# Patient Record
Sex: MALE | Employment: OTHER | URBAN - METROPOLITAN AREA
[De-identification: names, ages, dates, MRNs, and addresses within clinical notes are randomized per-mention and may not be internally consistent; named-entity substitution may affect disease eponyms.]

---

## 2020-01-08 ENCOUNTER — TRANSCRIBE ORDERS (OUTPATIENT)
Dept: ADMINISTRATIVE | Facility: HOSPITAL | Age: 65
End: 2020-01-08

## 2020-01-08 DIAGNOSIS — C61 MALIGNANT NEOPLASM OF PROSTATE (HCC): Primary | ICD-10-CM

## 2020-01-08 DIAGNOSIS — Z85.46 PERSONAL HISTORY OF MALIGNANT NEOPLASM OF PROSTATE: Primary | ICD-10-CM

## 2022-09-20 ENCOUNTER — TELEPHONE (OUTPATIENT)
Dept: GASTROENTEROLOGY | Facility: CLINIC | Age: 67
End: 2022-09-20

## 2022-10-21 NOTE — TELEPHONE ENCOUNTER
I lmom for pt to please call back to schedule a colonoscopy with Dr Filemon Cabral  If pt calls back, please get him scheduled  Thank you!

## 2022-11-10 ENCOUNTER — TELEPHONE (OUTPATIENT)
Dept: GASTROENTEROLOGY | Facility: CLINIC | Age: 67
End: 2022-11-10

## 2022-11-10 ENCOUNTER — PREP FOR PROCEDURE (OUTPATIENT)
Dept: GASTROENTEROLOGY | Facility: CLINIC | Age: 67
End: 2022-11-10

## 2022-11-10 DIAGNOSIS — Z86.010 HISTORY OF COLON POLYPS: Primary | ICD-10-CM

## 2022-11-10 DIAGNOSIS — Z12.11 COLON CANCER SCREENING: Primary | ICD-10-CM

## 2022-11-10 NOTE — TELEPHONE ENCOUNTER
Scheduled date of colonoscopy (as of today): 2/02/2023    Physician performing colonoscopy: Dr Angel Arnold    Location of colonoscopy: [facility]    Bowel prep reviewed with patient: Martha    Instructions reviewed with patient by: AS    Clearances: Cardiac - Pt's cardiologist is Dr Lauren Dang - Phone number is [phone] x [phone]  Pt does not have fax number

## 2022-11-10 NOTE — TELEPHONE ENCOUNTER
Please obtain cardiac clearance for pt  Pt sees cardiologist due to fast heartbeat  Pt's cardiologist is Dr Reeves [facility] - Phone number is [phone] x [phone]  Pt does not have fax number

## 2022-11-10 NOTE — TELEPHONE ENCOUNTER
[facility] Assessment    Name: Alexandre Longoria  YOB: 1955  Last Height: 6'1  Last weight: 260 lb  BMI: None  Procedure: Colon  Diagnosis: Hx of polyps  Date of procedure: 2/02/2023  Prep: Golytely  Responsible : Pt's girlfriend will be ubkota w/ him  Phone#: [phone]  Name completing form: Lexi Soni  Date form completed: 11/10/22      If the patient answers yes to any of these questions, schedule in a hospital  Are you pregnant: No  Do you rely on a wheelchair for mobility: No  Have you been diagnosed with End Stage Renal Disease (ESRD): No  Do you need oxygen during the day: No  Have you had a heart attack or stroke within the past three months: No  Have you had a seizure within the past three months: No  Have you ever been informed by anesthesia that you have a difficult airway: No  Additional Questions  Have you had any cardiac testing or are under the care of a Cardiologist (see cardiac list): Yes (Comment: Obtain Cardiac Clearance)  Cardiac list:   Do you have an implanted cardiac defibrillator: No (Comment:  This patient should be scheduled in the hospital)    Have any bleeding problems, such as anemia or hemophilia (If patient has H&H result below 8, schedule in hospital   H&H must be within 30 days of procedure): No    Had an organ transplant within the past 3 months: No    Do you have any present infections: No  Do you get short of breath when walking a few blocks: No  Have you been diagnosed with diabetes: Yes  Comments (provide cardiac provider information if applicable):

## 2023-01-24 ENCOUNTER — TELEPHONE (OUTPATIENT)
Dept: GASTROENTEROLOGY | Facility: CLINIC | Age: 68
End: 2023-01-24

## 2023-01-24 NOTE — TELEPHONE ENCOUNTER
Scheduled date of colonoscopy (as of today): 04/11/2023  Physician performing colonoscopy: Dr Jose Luis Church  Location of colonoscopy: [facility]  Clearances: Cardiology      FYI patient requested the week of April 10th with any provider since he was off that week  I did offer [facility] with Dr Rory Valdes but he declined

## 2023-03-28 ENCOUNTER — TELEPHONE (OUTPATIENT)
Dept: GASTROENTEROLOGY | Facility: CLINIC | Age: 68
End: 2023-03-28

## 2023-03-28 NOTE — TELEPHONE ENCOUNTER
Spoke to pt  confirming pt's colonoscopy scheduled on 4/11/23 at [facility] with Dr Marroquin  Informed [facility] would be calling 1-2 days prior with the arrival time  Informed of clear liquid diet day prior as well as the bowel cleansing preparation  Informed would need a  the day of the procedure due to being under sedation  Pt is not sure if still has the instructions and asked me to mail to him  I did so per his request   Pt still does have the Donitaely prep

## 2023-04-05 ENCOUNTER — TELEPHONE (OUTPATIENT)
Dept: GASTROENTEROLOGY | Facility: CLINIC | Age: 68
End: 2023-04-05

## 2023-04-05 NOTE — TELEPHONE ENCOUNTER
ASC Assessment    If the patient answers yes to any of these questions, schedule in a hospital  Are you pregnant: No  Do you rely on a wheelchair for mobility: No  Have you been diagnosed with End Stage Renal Disease (ESRD): No  Do you need oxygen during the day: No  Have you had a heart attack or stroke within the past three months: No  Have you had a seizure within the past three months: No  Have you ever been informed by anesthesia that you have a difficult airway: No  Additional Questions  Have you had any cardiac testing or are under the care of a Cardiologist (see cardiac list): Yes (Comment: Obtain Cardiac Clearance)  Cardiac list:   Do you have an implanted cardiac defibrillator: No (Comment:  This patient should be scheduled in the hospital)    Have any bleeding problems, such as anemia or hemophilia (If patient has H&H result below 8, schedule in hospital   H&H must be within 30 days of procedure): No    Had an organ transplant within the past 3 months: No    Do you have any present infections: No  Do you get short of breath when walking a few blocks: No  Have you been diagnosed with diabetes: Yes  Comments (provide cardiac provider information if applicable): Cardiac clearance in chart

## 2023-10-04 ENCOUNTER — TELEPHONE (OUTPATIENT)
Age: 68
End: 2023-10-04

## 2023-10-04 NOTE — TELEPHONE ENCOUNTER
Scheduled date of colonoscopy (as of today): 11/02/2023    Physician performing colonoscopy: Lupe Hinojosa    Location of colonoscopy: [facility]    Bowel prep reviewed with patient: Miralax/Dulcolax    Instructions reviewed with patient by: Email    Clearances: None

## 2023-10-04 NOTE — TELEPHONE ENCOUNTER
10/04/23  Screened by: Duane Nutting    Referring Provider -  Jass Tsai    [id number]  6' 1"  33.9    Has patient been referred for a routine screening Colonoscopy? yes  Is the patient between 43-73 years old? yes      Previous Colonoscopy yes   If yes:    Date: 2019    Facility: [facility]    Reason: Routine Screening      SCHEDULING STAFF: If the patient is between 45yrs-49yrs, please advise patient to confirm benefits/coverage with their insurance company for a routine screening colonoscopy, some insurance carriers will only cover at [address] [place] [address] or older. If the patient is over 66years old, please schedule an office visit. Does the patient want to see a Gastroenterologist prior to their procedure OR are they having any GI symptoms? no    Has the patient been hospitalized or had abdominal surgery in the past 6 months? no    Does the patient use supplemental oxygen? no    Does the patient take Coumadin, Lovenox, Plavix, Elliquis, Xarelto, or other blood thinning medication? no    Has the patient had a stroke, cardiac event, or stent placed in the past year? no    SCHEDULING STAFF: If patient answers NO to above questions, then schedule procedure. If patient answers YES to above questions, then schedule office appointment. If patient is between 45yrs - 49yrs, please advise patient that we will have to confirm benefits & coverage with their insurance company for a routine screening colonoscopy.

## 2023-10-19 ENCOUNTER — ANESTHESIA EVENT (OUTPATIENT)
Dept: ANESTHESIOLOGY | Facility: HOSPITAL | Age: 68
End: 2023-10-19

## 2023-10-19 ENCOUNTER — ANESTHESIA (OUTPATIENT)
Dept: ANESTHESIOLOGY | Facility: HOSPITAL | Age: 68
End: 2023-10-19

## 2023-11-01 RX ORDER — SODIUM CHLORIDE, SODIUM LACTATE, POTASSIUM CHLORIDE, CALCIUM CHLORIDE 600; 310; 30; 20 MG/100ML; MG/100ML; MG/100ML; MG/100ML
125 INJECTION, SOLUTION INTRAVENOUS CONTINUOUS
Status: CANCELLED | OUTPATIENT
Start: 2023-11-01

## 2023-11-02 ENCOUNTER — HOSPITAL ENCOUNTER (OUTPATIENT)
Dept: GASTROENTEROLOGY | Facility: AMBULARY SURGERY CENTER | Age: 68
Setting detail: OUTPATIENT SURGERY
End: 2023-11-02
Attending: INTERNAL MEDICINE
Payer: COMMERCIAL

## 2023-11-02 ENCOUNTER — ANESTHESIA EVENT (OUTPATIENT)
Dept: GASTROENTEROLOGY | Facility: AMBULARY SURGERY CENTER | Age: 68
End: 2023-11-02

## 2023-11-02 ENCOUNTER — ANESTHESIA (OUTPATIENT)
Dept: GASTROENTEROLOGY | Facility: AMBULARY SURGERY CENTER | Age: 68
End: 2023-11-02

## 2023-11-02 VITALS
HEIGHT: 73 IN | HEART RATE: 94 BPM | WEIGHT: 260 LBS | DIASTOLIC BLOOD PRESSURE: 72 MMHG | OXYGEN SATURATION: 95 % | TEMPERATURE: 97.8 F | BODY MASS INDEX: 34.46 KG/M2 | SYSTOLIC BLOOD PRESSURE: 130 MMHG | RESPIRATION RATE: 16 BRPM

## 2023-11-02 DIAGNOSIS — Z86.010 HISTORY OF COLON POLYPS: ICD-10-CM

## 2023-11-02 PROBLEM — C61 MALIGNANT NEOPLASM OF PROSTATE (HCC): Status: ACTIVE | Noted: 2023-11-02

## 2023-11-02 PROBLEM — E66.9 OBESITY: Status: ACTIVE | Noted: 2023-11-02

## 2023-11-02 PROBLEM — Z87.891 EX-SMOKER: Status: ACTIVE | Noted: 2023-11-02

## 2023-11-02 PROBLEM — Z87.19 HISTORY OF GASTROESOPHAGEAL REFLUX (GERD): Status: ACTIVE | Noted: 2023-11-02

## 2023-11-02 PROBLEM — G47.33 OBSTRUCTIVE SLEEP APNEA (ADULT) (PEDIATRIC): Status: ACTIVE | Noted: 2023-11-02

## 2023-11-02 PROBLEM — I10 HYPERTENSION: Status: ACTIVE | Noted: 2023-11-02

## 2023-11-02 PROBLEM — I49.01 VENTRICULAR FIBRILLATION (HCC): Status: ACTIVE | Noted: 2023-11-02

## 2023-11-02 PROBLEM — F10.11 HISTORY OF ALCOHOL ABUSE: Status: ACTIVE | Noted: 2023-11-02

## 2023-11-02 PROBLEM — E11.9 TYPE 2 DIABETES MELLITUS (HCC): Status: ACTIVE | Noted: 2023-11-02

## 2023-11-02 LAB — GLUCOSE SERPL-MCNC: 134 MG/DL (ref 65–140)

## 2023-11-02 PROCEDURE — G0121 COLON CA SCRN NOT HI RSK IND: HCPCS | Performed by: INTERNAL MEDICINE

## 2023-11-02 PROCEDURE — 82948 REAGENT STRIP/BLOOD GLUCOSE: CPT

## 2023-11-02 RX ORDER — SODIUM CHLORIDE, SODIUM LACTATE, POTASSIUM CHLORIDE, CALCIUM CHLORIDE 600; 310; 30; 20 MG/100ML; MG/100ML; MG/100ML; MG/100ML
INJECTION, SOLUTION INTRAVENOUS CONTINUOUS PRN
Status: DISCONTINUED | OUTPATIENT
Start: 2023-11-02 | End: 2023-11-02

## 2023-11-02 RX ORDER — PROPOFOL 10 MG/ML
INJECTION, EMULSION INTRAVENOUS CONTINUOUS PRN
Status: DISCONTINUED | OUTPATIENT
Start: 2023-11-02 | End: 2023-11-02

## 2023-11-02 RX ORDER — PROPOFOL 10 MG/ML
INJECTION, EMULSION INTRAVENOUS AS NEEDED
Status: DISCONTINUED | OUTPATIENT
Start: 2023-11-02 | End: 2023-11-02

## 2023-11-02 RX ORDER — LIDOCAINE HYDROCHLORIDE 10 MG/ML
INJECTION, SOLUTION EPIDURAL; INFILTRATION; INTRACAUDAL; PERINEURAL AS NEEDED
Status: DISCONTINUED | OUTPATIENT
Start: 2023-11-02 | End: 2023-11-02

## 2023-11-02 RX ADMIN — LIDOCAINE HYDROCHLORIDE 50 MG: 10 INJECTION, SOLUTION EPIDURAL; INFILTRATION; INTRACAUDAL; PERINEURAL at 08:53

## 2023-11-02 RX ADMIN — SODIUM CHLORIDE, SODIUM LACTATE, POTASSIUM CHLORIDE, AND CALCIUM CHLORIDE: .6; .31; .03; .02 INJECTION, SOLUTION INTRAVENOUS at 08:46

## 2023-11-02 RX ADMIN — PROPOFOL 110 MCG/KG/MIN: 10 INJECTION, EMULSION INTRAVENOUS at 08:53

## 2023-11-02 RX ADMIN — PROPOFOL 120 MG: 10 INJECTION, EMULSION INTRAVENOUS at 08:53

## 2023-11-02 NOTE — H&P
History and Physical -  [facility]  Reta Francois 80 y.o. male MRN: [id number]        HPI: 72-year-old male with history of colon polyps. Regular bowel movements. Historical Information   History reviewed. No pertinent past medical history. History reviewed. No pertinent surgical history. Social History   Social History     Substance and Sexual Activity   Alcohol Use None     Social History     Substance and Sexual Activity   Drug Use Not on file     Social History     Tobacco Use   Smoking Status Not on file   Smokeless Tobacco Not on file     History reviewed. No pertinent family history. Meds/Allergies     (Not in a hospital admission)      No Known Allergies    Objective     Blood pressure 153/74, pulse 87, temperature 97.8 °F (36.6 °C), temperature source Temporal, resp. rate 16, weight 118 kg (260 lb), SpO2 96 %.     PHYSICAL EXAM:    Gen: NAD  CV: S1 & S2 normal, RRR  CHEST: Clear to auscultate  ABD: soft, NT/ND, good bowel sounds  EXT: no edema    ASSESSMENT:     History of colon polyps    PLAN:    Colonoscopy

## 2023-11-02 NOTE — ANESTHESIA POSTPROCEDURE EVALUATION
Post-Op Assessment Note    CV Status:  Stable  Pain Score: 0    Pain management: adequate     Mental Status:  Awake   Hydration Status:  Stable   PONV Controlled:  None   Airway Patency:  Patent      Post Op Vitals Reviewed: Yes      Staff: CRNA         No notable events documented.     BP   126/62   Temp      Pulse  70   Resp   14   SpO2   99

## 2023-11-02 NOTE — ANESTHESIA PREPROCEDURE EVALUATION
Procedure:  COLONOSCOPY    Relevant Problems   CARDIO   (+) Hypertension   (+) Ventricular fibrillation (HCC)      ENDO   (+) Type 2 diabetes mellitus (HCC)      /RENAL   (+) Malignant neoplasm of prostate (HCC)      PULMONARY   (+) Obstructive sleep apnea (adult) (pediatric)      Other   (+) Cocaine dependence in remission ([address])   (+) Ex-smoker   (+) History of alcohol abuse   (+) History of gastroesophageal reflux (GERD)   (+) Obesity      Pt receives his care through the [place], records not available for review. He reports history of dysrhythmia, but "A-fib" does not sound familiar to him. He does not have PM or ICD, reports being well controlled on medications for >1 yr and follows with his [place] cardiologist regularly. Poor historian. Good exercise tolerance. Physical Exam    Airway    Mallampati score: III  TM Distance: >3 FB  Neck ROM: full     Dental   Comment: Denies loose teeth     Cardiovascular  Cardiovascular exam normal    Pulmonary  Pulmonary exam normal     Other Findings  Portions of exam deferred due to low yield and/or unknown COVID status      Anesthesia Plan  ASA Score- 3     Anesthesia Type- IV sedation with anesthesia with ASA Monitors. Additional Monitors:     Airway Plan:            Plan Factors-Exercise tolerance (METS): >4 METS. Chart reviewed. Existing labs reviewed. Patient summary reviewed. Patient is not a current smoker. Induction- intravenous. Postoperative Plan-     Informed Consent- Anesthetic plan and risks discussed with patient. I personally reviewed this patient with the CRNA. Discussed and agreed on the Anesthesia Plan with the CRNA. Fabio Corona

## 2024-01-26 ENCOUNTER — OFFICE VISIT (OUTPATIENT)
Dept: URGENT CARE | Facility: CLINIC | Age: 69
End: 2024-01-26
Payer: COMMERCIAL

## 2024-01-26 VITALS
TEMPERATURE: 98 F | HEART RATE: 83 BPM | WEIGHT: 259 LBS | BODY MASS INDEX: 34.17 KG/M2 | RESPIRATION RATE: 16 BRPM | OXYGEN SATURATION: 97 %

## 2024-01-26 DIAGNOSIS — R05.9 COUGH, UNSPECIFIED TYPE: Primary | ICD-10-CM

## 2024-01-26 PROCEDURE — 99213 OFFICE O/P EST LOW 20 MIN: CPT | Performed by: PREVENTIVE MEDICINE

## 2024-01-26 PROCEDURE — 87636 SARSCOV2 & INF A&B AMP PRB: CPT | Performed by: PREVENTIVE MEDICINE

## 2024-01-26 RX ORDER — CHLORAL HYDRATE 500 MG
2000 CAPSULE ORAL
COMMUNITY
Start: 2023-10-03

## 2024-01-26 RX ORDER — MAGNESIUM OXIDE 420 MG/1
840 TABLET ORAL
COMMUNITY
Start: 2024-01-08

## 2024-01-26 NOTE — LETTER
January 26, 2024     Patient: Rubens Dominguez   YOB: 1955   Date of Visit: 1/26/2024       To Whom It May Concern:    It is my medical opinion that Rubens Dominguez may return to work on 02/03 .    If you have any questions or concerns, please don't hesitate to call.         Sincerely,        Hernán Goldman MD    CC: No Recipients

## 2024-01-26 NOTE — PROGRESS NOTES
[facility]        NAME: Rubens Dominguez is a 69 y.o. male  : 1955    MRN: [id number]  DATE: 2024  TIME: 11:32 AM    Assessment and Plan   Cough, unspecified type [R05.9]  1. Cough, unspecified type  Covid19 and INFLUENZA A/B PCR            Patient Instructions       Follow up with PCP in 3-5 days.  Proceed to  ER if symptoms worsen.    Chief Complaint     Chief Complaint   Patient presents with    Cold Like Symptoms     Pt presents with head congestion, hoarse voice, PND; started Wednesday; needs a work note         History of Present Illness       Has cold type symptoms since Wednesday.  Mild cough.  Fever.  No shortness of breath.        Review of Systems   Review of Systems   Constitutional:  Negative for fever.   HENT:  Positive for congestion.    Respiratory:  Positive for cough. Negative for shortness of breath.          Current Medications       Current Outpatient Medications:     Alogliptin Benzoate 25 MG TABS, Take 25 mg by mouth, Disp: , Rfl:     AMILoride 5 mg tablet, Take 5 mg by mouth daily, Disp: , Rfl:     atorvastatin (LIPITOR) 40 mg tablet, Take 40 mg by mouth daily, Disp: , Rfl:     Ciclopirox 8 % KIT, APPLY SMALL AMOUNT TOPICALLY DAILY FOR FUNGAL INFECTION, Disp: , Rfl:     gemfibrozil (LOPID) 600 mg tablet, Take 600 mg by mouth 2 (two) times a day, Disp: , Rfl:     glimepiride (AMARYL) 2 mg tablet, Take 2 mg by mouth daily with breakfast, Disp: , Rfl:     lisinopril (ZESTRIL) 2.5 mg tablet, Take 2.5 mg by mouth daily, Disp: , Rfl:     Magnesium Oxide 420 MG TABS, 840 mg, Disp: , Rfl:     metFORMIN (GLUCOPHAGE-XR) 500 mg 24 hr tablet, Take 500 mg by mouth daily with breakfast, Disp: , Rfl:     metoprolol succinate (TOPROL-XL) 100 mg 24 hr tablet, Take 100 mg by mouth daily, Disp: , Rfl:     Omega-3 Fatty Acids (fish oil) 1,000 mg, 2,000 mg, Disp: , Rfl:     tamsulosin (FLOMAX) 0.4 mg, Take 0.4 mg by mouth daily with dinner, Disp: , Rfl:     Current Allergies      Allergies as of 01/26/2024 - Reviewed 01/26/2024   Allergen Reaction Noted    Pseudoephedrine Rash 03/16/2005            The following portions of the patient's history were reviewed and updated as appropriate: allergies, current medications, past family history, past medical history, past social history, past surgical history and problem list.     Past Medical History:   Diagnosis Date    Diabetes mellitus (HCC)     Hyperlipidemia     Hypertension        Past Surgical History:   Procedure Laterality Date    TONSILLECTOMY         History reviewed. No pertinent family history.      Medications have been verified.        Objective   Pulse 83   Temp 98 °F (36.7 °C)   Resp 16   Wt 117 kg (259 lb)   SpO2 97%   BMI 34.17 kg/m²   No LMP for male patient.       Physical Exam     Physical Exam  HENT:      Right Ear: Tympanic membrane normal.      Left Ear: Tympanic membrane normal.      Mouth/Throat:      Mouth: Mucous membranes are moist.      Pharynx: Oropharynx is clear. No oropharyngeal exudate.   Pulmonary:      Breath sounds: Normal breath sounds. No wheezing, rhonchi or rales.

## 2024-01-26 NOTE — PATIENT INSTRUCTIONS
Drink at least 6 or 8 glasses of water or juice a day.  Using a vaporizer or humidifier in the bedroom will be very helpful.  Motrin or Aleve or aspirin for fever chills or aches.  Robitussin DM or NyQuil for cough.  Afrin nasal spray for facial and head congestion. Inhaling steam coming up from the sink will be very helpful.  If symptoms are getting worse over the next 4-7 days you must be rechecked.  You can also try decongestant once a day such as Zyrtec or Allegra

## 2024-01-27 LAB
FLUAV RNA RESP QL NAA+PROBE: NEGATIVE
FLUBV RNA RESP QL NAA+PROBE: NEGATIVE
SARS-COV-2 RNA RESP QL NAA+PROBE: NEGATIVE